# Patient Record
Sex: FEMALE | Race: OTHER | URBAN - NONMETROPOLITAN AREA
[De-identification: names, ages, dates, MRNs, and addresses within clinical notes are randomized per-mention and may not be internally consistent; named-entity substitution may affect disease eponyms.]

---

## 2017-06-07 ENCOUNTER — APPOINTMENT (RX ONLY)
Dept: URBAN - NONMETROPOLITAN AREA CLINIC 22 | Facility: CLINIC | Age: 72
Setting detail: DERMATOLOGY
End: 2017-06-07

## 2017-06-07 DIAGNOSIS — Z41.9 ENCOUNTER FOR PROCEDURE FOR PURPOSES OTHER THAN REMEDYING HEALTH STATE, UNSPECIFIED: ICD-10-CM

## 2017-06-07 PROCEDURE — ? MEDICAL CONSULTATION: CHEMICAL PEELS

## 2017-06-07 PROCEDURE — ? MEDICAL CONSULTATION: PRODUCTS

## 2017-06-07 PROCEDURE — ? PRODUCT LINE (ACNE)

## 2017-06-07 PROCEDURE — 99211 OFF/OP EST MAY X REQ PHY/QHP: CPT

## 2017-06-07 PROCEDURE — ? PRODUCT LINE (SUNSCREENS)

## 2017-06-07 PROCEDURE — ? HYDRAFACIAL

## 2017-06-07 PROCEDURE — ? MEDICAL CONSULTATION HYDRAFACIAL

## 2017-06-07 ASSESSMENT — LOCATION DETAILED DESCRIPTION DERM
LOCATION DETAILED: INFERIOR MID FOREHEAD
LOCATION DETAILED: RIGHT INFERIOR MEDIAL FOREHEAD

## 2017-06-07 ASSESSMENT — LOCATION SIMPLE DESCRIPTION DERM
LOCATION SIMPLE: INFERIOR FOREHEAD
LOCATION SIMPLE: RIGHT FOREHEAD

## 2017-06-07 ASSESSMENT — LOCATION ZONE DERM: LOCATION ZONE: FACE

## 2017-06-07 NOTE — PROCEDURE: PRODUCT LINE (ACNE)
Name Of Product 2: SkinCeuticals LHA Cleanser
Product 37 Price (In Dollars - Numeric Only, No Special Characters Or $): 0.00
Product 6 Units: 0
Product 7 Application Directions: $11.00 + $0.66 = $11.66
Name Of Product 3: Clarisonic Brush Head
Product 2 Price (In Dollars - Numeric Only, No Special Characters Or $): 33.39
Product 3 Application Directions: $25.00 + $1.50 = $26.50\\n\\nPt purchased Deep Pore brush head.
Product 5 Price (In Dollars - Numeric Only, No Special Characters Or $): 36.04
Send Charges To Patient Encounter: Yes
Name Of Product 7: CeraVe Foaming Facial Cleanser
Product 8 Application Directions: $10.00 + $0.60 = $10.60
Name Of Product 8: Cera Ve Hydrating Cleanser
Product 5 Application Directions: $34.00 + $2.04 = $36.04
Product 1 Price (In Dollars - Numeric Only, No Special Characters Or $): 157.94
Product 7 Units: 1
Product 7 Price (In Dollars - Numeric Only, No Special Characters Or $): 11.66
Name Of Product 6: SkinCeuticals Clarifying Cleanser
Product 4 Application Directions: $34.00 + $2.04 = $36.04\\n
Name Of Product 1: Clarisonic Sheila 2
Name Of Product 4: SkinCeuticals Simply Clean Cleanser
Detail Level: Zone
Product 3 Price (In Dollars - Numeric Only, No Special Characters Or $): 26.50
Product 2 Application Directions: $35.00 + $2.10 = $37.10 x 10% OFF= $33.39\\n\\nPt paid more than $250 in cosmetic procedures today. Pt received 10% OFF products.
Name Of Product 5: SkinCeuticals Conditioning Solution
Product 8 Price (In Dollars - Numeric Only, No Special Characters Or $): 10.60
Product 1 Application Directions: Use PM to wash face with mild cleanser\\n\\n$149.00 + $8.94= $157.94

## 2017-06-07 NOTE — PROCEDURE: HYDRAFACIAL
Treatment Number: 1
Indication: skin texture
Price (Use Numbers Only, No Special Characters Or $): 150.00
Detail Level: Zone
Comments: Pt tolerated procedure without any complications. \\n\\nA few extractions of open comedo were extracted throughout pt entire face with a comedo extractor.
Post-Care Instructions: I reviewed with the patient in detail post-care instructions. Patient should stay away from the sun and wear sun protection until treated areas are fully healed.
Glycolic Acid %: 7.5%
Consent: Prior to treatment, written consent was obtained and risks were reviewed including but not limited to crusting, scabbing, blistering, scarring, darker or lighter pigmentary change, bruising, and/or incomplete response.
Vacuum Pressure: 17

## 2017-06-07 NOTE — PROCEDURE: PRODUCT LINE (SUNSCREENS)
Product 76 Units: 0
Product 1 Price (In Dollars - Numeric Only, No Special Characters Or $): 27.56
Product 5 Price (In Dollars - Numeric Only, No Special Characters Or $): 26.50
Name Of Product 7: Elta MD Clear
Product 3 Application Directions: $12.00 + $0.72 = $12.72
Product 27 Price (In Dollars - Numeric Only, No Special Characters Or $): 0.00
Product 9 Price (In Dollars - Numeric Only, No Special Characters Or $): 47.59
Product 11 Application Directions: $32.00 + $1.92 = $33.92
Product 12 Application Directions: $17.00 + $1.02= $18.02
Name Of Product 8: SkinCeutcals Physical Eye Uv Defense
Detail Level: Zone
Product 6 Units: 1
Product 5 Application Directions: $25.00 + $1.50 = $26.50
Name Of Product 2: Blue Lizzard Baby
Name Of Product 3: Cera Ve Face Lotion
Product 10 Application Directions: $9.00 + $1.50 = $10.50
Product 12 Price (In Dollars - Numeric Only, No Special Characters Or $): 18.02
Name Of Product 5: Tizo
Product 7 Application Directions: $15.00 + $.90 = $15.90
Product 10 Price (In Dollars - Numeric Only, No Special Characters Or $): 10.50
Product 6 Price (In Dollars - Numeric Only, No Special Characters Or $): 36.04
Name Of Product 10: Tizo Liptect
Name Of Product 11: Elta UV Lotion
Name Of Product 9: Colorescience Brush
Render Product Pricing In Note: Yes
Product 8 Price (In Dollars - Numeric Only, No Special Characters Or $): 31.80
Name Of Product 6: SkinCeuticals Physical Fusion UV Defense
Product 3 Price (In Dollars - Numeric Only, No Special Characters Or $): 12.72
Name Of Product 4: Elta MD Daily
Product 6 Application Directions: $34.00 + $2.04 = $36.04\\n\\n.
Product 9 Application Directions: $45.00 + $2.70 = $47.70
Product 1 Application Directions: NIA6\\n\\n$26.00 + $1.56= $27.56
Product 8 Application Directions: $30.00 + $1.80 = $31.80
Product 7 Price (In Dollars - Numeric Only, No Special Characters Or $): 15.90
Name Of Product 1: NIA24 Sundamage Prevention Sunscreen PA
Product 11 Price (In Dollars - Numeric Only, No Special Characters Or $): 33.92
Product 4 Application Directions: $26.00 + $1.56 = $27.56

## 2021-01-14 ENCOUNTER — APPOINTMENT (RX ONLY)
Dept: URBAN - NONMETROPOLITAN AREA CLINIC 22 | Facility: CLINIC | Age: 76
Setting detail: DERMATOLOGY
End: 2021-01-14

## 2021-01-14 DIAGNOSIS — L81.4 OTHER MELANIN HYPERPIGMENTATION: ICD-10-CM

## 2021-01-14 DIAGNOSIS — L57.0 ACTINIC KERATOSIS: ICD-10-CM

## 2021-01-14 DIAGNOSIS — L57.8 OTHER SKIN CHANGES DUE TO CHRONIC EXPOSURE TO NONIONIZING RADIATION: ICD-10-CM

## 2021-01-14 PROCEDURE — 17000 DESTRUCT PREMALG LESION: CPT

## 2021-01-14 PROCEDURE — 99203 OFFICE O/P NEW LOW 30 MIN: CPT | Mod: 25

## 2021-01-14 PROCEDURE — ? ADDITIONAL NOTES

## 2021-01-14 PROCEDURE — ? LIQUID NITROGEN

## 2021-01-14 PROCEDURE — ? COUNSELING

## 2021-01-14 PROCEDURE — 17003 DESTRUCT PREMALG LES 2-14: CPT

## 2021-01-14 ASSESSMENT — LOCATION DETAILED DESCRIPTION DERM
LOCATION DETAILED: LEFT INFERIOR MEDIAL MALAR CHEEK
LOCATION DETAILED: RIGHT INCISURA INTERTRAGICA
LOCATION DETAILED: RIGHT CENTRAL BUCCAL CHEEK
LOCATION DETAILED: SUPERIOR MID FOREHEAD
LOCATION DETAILED: LEFT INFERIOR MEDIAL FOREHEAD
LOCATION DETAILED: RIGHT INFERIOR LATERAL FOREHEAD
LOCATION DETAILED: LEFT TRAGUS
LOCATION DETAILED: RIGHT NASAL DORSUM
LOCATION DETAILED: RIGHT SUPERIOR LATERAL MALAR CHEEK
LOCATION DETAILED: LEFT NASAL DORSUM
LOCATION DETAILED: RIGHT CENTRAL ZYGOMA
LOCATION DETAILED: LEFT NASAL SIDEWALL
LOCATION DETAILED: LEFT INFERIOR FOREHEAD
LOCATION DETAILED: LEFT CENTRAL MALAR CHEEK

## 2021-01-14 ASSESSMENT — LOCATION SIMPLE DESCRIPTION DERM
LOCATION SIMPLE: LEFT NOSE
LOCATION SIMPLE: LEFT CHEEK
LOCATION SIMPLE: RIGHT CHEEK
LOCATION SIMPLE: LEFT EAR
LOCATION SIMPLE: RIGHT FOREHEAD
LOCATION SIMPLE: NOSE
LOCATION SIMPLE: LEFT FOREHEAD
LOCATION SIMPLE: RIGHT EAR
LOCATION SIMPLE: LEFT FOREHEAD
LOCATION SIMPLE: SUPERIOR FOREHEAD
LOCATION SIMPLE: RIGHT ZYGOMA

## 2021-01-14 ASSESSMENT — LOCATION ZONE DERM
LOCATION ZONE: EAR
LOCATION ZONE: FACE
LOCATION ZONE: NOSE
LOCATION ZONE: FACE

## 2021-02-19 ENCOUNTER — APPOINTMENT (RX ONLY)
Dept: URBAN - NONMETROPOLITAN AREA CLINIC 22 | Facility: CLINIC | Age: 76
Setting detail: DERMATOLOGY
End: 2021-02-19

## 2021-02-19 DIAGNOSIS — L81.7 PIGMENTED PURPURIC DERMATOSIS: ICD-10-CM | Status: INADEQUATELY CONTROLLED

## 2021-02-19 DIAGNOSIS — D22 MELANOCYTIC NEVI: ICD-10-CM | Status: UNCHANGED

## 2021-02-19 DIAGNOSIS — L82.1 OTHER SEBORRHEIC KERATOSIS: ICD-10-CM

## 2021-02-19 DIAGNOSIS — L57.8 OTHER SKIN CHANGES DUE TO CHRONIC EXPOSURE TO NONIONIZING RADIATION: ICD-10-CM

## 2021-02-19 DIAGNOSIS — L85.3 XEROSIS CUTIS: ICD-10-CM | Status: INADEQUATELY CONTROLLED

## 2021-02-19 DIAGNOSIS — D18.0 HEMANGIOMA: ICD-10-CM

## 2021-02-19 DIAGNOSIS — Z71.89 OTHER SPECIFIED COUNSELING: ICD-10-CM

## 2021-02-19 DIAGNOSIS — L81.4 OTHER MELANIN HYPERPIGMENTATION: ICD-10-CM | Status: UNCHANGED

## 2021-02-19 PROBLEM — D18.01 HEMANGIOMA OF SKIN AND SUBCUTANEOUS TISSUE: Status: ACTIVE | Noted: 2021-02-19

## 2021-02-19 PROBLEM — D22.5 MELANOCYTIC NEVI OF TRUNK: Status: ACTIVE | Noted: 2021-02-19

## 2021-02-19 PROCEDURE — 99213 OFFICE O/P EST LOW 20 MIN: CPT

## 2021-02-19 PROCEDURE — ? COUNSELING

## 2021-02-19 PROCEDURE — ? FULL BODY SKIN EXAM

## 2021-02-19 ASSESSMENT — LOCATION DETAILED DESCRIPTION DERM
LOCATION DETAILED: RIGHT DISTAL PRETIBIAL REGION
LOCATION DETAILED: LEFT INFERIOR UPPER BACK
LOCATION DETAILED: SUPERIOR THORACIC SPINE
LOCATION DETAILED: LEFT MID-UPPER BACK
LOCATION DETAILED: RIGHT PROXIMAL PRETIBIAL REGION
LOCATION DETAILED: LEFT DISTAL PRETIBIAL REGION

## 2021-02-19 ASSESSMENT — LOCATION SIMPLE DESCRIPTION DERM
LOCATION SIMPLE: UPPER BACK
LOCATION SIMPLE: LEFT PRETIBIAL REGION
LOCATION SIMPLE: LEFT UPPER BACK
LOCATION SIMPLE: RIGHT PRETIBIAL REGION

## 2021-02-19 ASSESSMENT — LOCATION ZONE DERM
LOCATION ZONE: LEG
LOCATION ZONE: TRUNK

## 2021-02-19 ASSESSMENT — SEVERITY ASSESSMENT: SEVERITY: MILD TO MODERATE

## 2021-02-19 ASSESSMENT — BSA RASH: BSA RASH: 5

## 2022-09-21 ENCOUNTER — APPOINTMENT (RX ONLY)
Dept: URBAN - NONMETROPOLITAN AREA CLINIC 22 | Facility: CLINIC | Age: 77
Setting detail: DERMATOLOGY
End: 2022-09-21

## 2022-09-21 VITALS — HEIGHT: 64 IN | WEIGHT: 152 LBS

## 2022-09-21 DIAGNOSIS — B37.2 CANDIDIASIS OF SKIN AND NAIL: ICD-10-CM

## 2022-09-21 PROCEDURE — ? TREATMENT REGIMEN

## 2022-09-21 PROCEDURE — ? COUNSELING

## 2022-09-21 PROCEDURE — ? PRESCRIPTION

## 2022-09-21 PROCEDURE — 99213 OFFICE O/P EST LOW 20 MIN: CPT

## 2022-09-21 RX ORDER — MICONAZOLE NITRATE 20 MG/G
CREAM VAGINAL
Qty: 45 | Refills: 0 | Status: ERX | COMMUNITY
Start: 2022-09-21

## 2022-09-21 RX ADMIN — MICONAZOLE NITRATE: 20 CREAM VAGINAL at 00:00

## 2022-09-21 ASSESSMENT — LOCATION SIMPLE DESCRIPTION DERM
LOCATION SIMPLE: GROIN
LOCATION SIMPLE: LABIA MAJORA

## 2022-09-21 ASSESSMENT — LOCATION ZONE DERM
LOCATION ZONE: VULVA
LOCATION ZONE: TRUNK

## 2022-09-21 ASSESSMENT — LOCATION DETAILED DESCRIPTION DERM
LOCATION DETAILED: LEFT LABIUM MAJUS
LOCATION DETAILED: RIGHT LABIUM MAJUS
LOCATION DETAILED: RIGHT INGUINAL CREASE
LOCATION DETAILED: LEFT INGUINAL CREASE

## 2022-09-21 NOTE — PROCEDURE: TREATMENT REGIMEN
Plan: Unable to send in RX of oral diflucan due to medication interactions
Otc Regimen: Desitin original- use a cool/cold wash cloth on the affected area for comfort
Initiate Treatment: Miconazole-7  2 % vaginal cream \\nQuantity: 45.0 g\\nSig: Apply to vulva and upper legs bid for 1 week repeat if needed
Discontinue Regimen: Clobetasol
Detail Level: Simple

## 2022-09-21 NOTE — PROCEDURE: MIPS QUALITY
Quality 130: Documentation Of Current Medications In The Medical Record: Current Medications Documented
Quality 431: Preventive Care And Screening: Unhealthy Alcohol Use - Screening: Patient not identified as an unhealthy alcohol user when screened for unhealthy alcohol use using a systematic screening method
Quality 226: Preventive Care And Screening: Tobacco Use: Screening And Cessation Intervention: Patient screened for tobacco use and is an ex/non-smoker
Quality 111:Pneumonia Vaccination Status For Older Adults: Pneumococcal Vaccination Previously Received
Quality 47: Advance Care Plan: Advance care planning not documented, reason not otherwise specified.
Detail Level: Detailed

## 2022-09-21 NOTE — HPI: RASH
What Type Of Note Output Would You Prefer (Optional)?: Bullet Format
Is The Patient Presenting As Previously Scheduled?: No, they are coming in before their scheduled appointment
Is This A New Presentation, Or A Follow-Up?: Rash
Additional History: Patient also has lichen sclerosis. Had cream for that DX